# Patient Record
Sex: FEMALE | Race: WHITE | Employment: OTHER | URBAN - METROPOLITAN AREA
[De-identification: names, ages, dates, MRNs, and addresses within clinical notes are randomized per-mention and may not be internally consistent; named-entity substitution may affect disease eponyms.]

---

## 2017-05-11 PROBLEM — I73.9 PAD (PERIPHERAL ARTERY DISEASE) (HCC): Status: ACTIVE | Noted: 2017-05-11

## 2017-06-07 PROBLEM — Z86.73 HX-TIA (TRANSIENT ISCHEMIC ATTACK): Status: ACTIVE | Noted: 2017-06-07

## 2017-06-07 PROBLEM — R09.89 CAROTID BRUIT: Status: ACTIVE | Noted: 2017-06-07

## 2017-06-22 PROCEDURE — 88175 CYTOPATH C/V AUTO FLUID REDO: CPT | Performed by: OBSTETRICS & GYNECOLOGY

## 2018-10-26 PROBLEM — Z96.0 PRESENCE OF PESSARY: Status: ACTIVE | Noted: 2018-10-26

## 2019-03-06 PROBLEM — R94.31 ABNORMAL EKG: Status: ACTIVE | Noted: 2019-03-06

## 2019-06-24 PROCEDURE — 88175 CYTOPATH C/V AUTO FLUID REDO: CPT | Performed by: OBSTETRICS & GYNECOLOGY

## 2020-10-13 ENCOUNTER — HOSPITAL ENCOUNTER (EMERGENCY)
Age: 85
Discharge: HOME | End: 2020-10-13
Attending: EMERGENCY MEDICINE
Payer: MEDICARE

## 2020-10-13 ENCOUNTER — APPOINTMENT (OUTPATIENT)
Dept: CT IMAGING | Age: 85
End: 2020-10-13
Attending: PHYSICIAN ASSISTANT
Payer: MEDICARE

## 2020-10-13 VITALS
DIASTOLIC BLOOD PRESSURE: 66 MMHG | HEIGHT: 64 IN | BODY MASS INDEX: 20.49 KG/M2 | RESPIRATION RATE: 18 BRPM | OXYGEN SATURATION: 96 % | SYSTOLIC BLOOD PRESSURE: 173 MMHG | TEMPERATURE: 98 F | WEIGHT: 120 LBS | HEART RATE: 81 BPM

## 2020-10-13 DIAGNOSIS — T07.XXXA MULTIPLE ABRASIONS: Primary | ICD-10-CM

## 2020-10-13 DIAGNOSIS — S00.83XA CONTUSION OF FACE, INITIAL ENCOUNTER: ICD-10-CM
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
## 2020-10-13 DIAGNOSIS — S09.93XA DENTAL INJURY, INITIAL ENCOUNTER: ICD-10-CM

## 2020-10-13 DIAGNOSIS — S01.511A LIP LACERATION, INITIAL ENCOUNTER: ICD-10-CM

## 2020-10-13 PROCEDURE — 12011 RPR F/E/E/N/L/M 2.5 CM/<: CPT | Performed by: PHYSICIAN ASSISTANT

## 2020-10-13 PROCEDURE — 99284 EMERGENCY DEPT VISIT MOD MDM: CPT | Performed by: PHYSICIAN ASSISTANT

## 2020-10-13 PROCEDURE — 90471 IMMUNIZATION ADMIN: CPT | Performed by: PHYSICIAN ASSISTANT

## 2020-10-13 PROCEDURE — 70450 CT HEAD/BRAIN W/O DYE: CPT | Performed by: PHYSICIAN ASSISTANT

## 2020-10-13 NOTE — ED PROVIDER NOTES
Patient Seen in: THE [facility] Emergency Department In [place]      History   Patient presents with:  Laceration/Abrasion  Fall    Stated Complaint: fall after getting tangled by her dog, no blood thinners, lip laceration    HPI    17-year-old female.   Di Jean vital signs reviewed. All other systems reviewed and negative except as noted above.     Physical Exam     ED Triage Vitals [10/13/20 1800]   BP (!) 173/66   Pulse 81   Resp 18   Temp 98 °F (36.7 °C)   Temp src Temporal   SpO2 96 %   O2 Device None (Ro ([address]) which includes the Dose Index Registry. PATIENT STATED HISTORY: (As transcribed by Technologist)  Patient complain of left supra-orbital laceration due to tripping and falling today, denies loc.     FINDINGS:  There is ce encounter diagnosis)  Fall, initial encounter  Contusion of face, initial encounter  Lip laceration, initial encounter  Dental injury, initial encounter    Disposition:  Discharge  10/13/2020  7:29 pm    Follow-up:  Terrence Duong  [address] [place] [address]

## 2021-09-08 PROBLEM — I73.9 CLAUDICATION (HCC): Status: ACTIVE | Noted: 2021-09-08

## 2024-08-09 ENCOUNTER — HOSPITAL ENCOUNTER (EMERGENCY)
Age: 89
Discharge: HOME | End: 2024-08-09
Payer: MEDICARE

## 2024-08-09 VITALS
DIASTOLIC BLOOD PRESSURE: 63 MMHG | SYSTOLIC BLOOD PRESSURE: 182 MMHG | OXYGEN SATURATION: 95 % | BODY MASS INDEX: 19 KG/M2 | TEMPERATURE: 99 F | HEART RATE: 81 BPM | WEIGHT: 110 LBS | RESPIRATION RATE: 16 BRPM

## 2024-08-09 DIAGNOSIS — S61.214A LACERATION OF RIGHT RING FINGER W/O FOREIGN BODY W/O DAMAGE TO NAIL, INITIAL ENCOUNTER: Primary | ICD-10-CM

## 2024-08-09 PROCEDURE — 99283 EMERGENCY DEPT VISIT LOW MDM: CPT

## 2024-08-09 PROCEDURE — 12001 RPR S/N/AX/GEN/TRNK 2.5CM/<: CPT

## 2024-08-09 RX ORDER — VALSARTAN 80 MG/1
80 TABLET ORAL DAILY
COMMUNITY

## 2024-08-09 NOTE — DISCHARGE INSTRUCTIONS
Keep wound clean and dry.   Do not get stitches wet for the first 24 hours.   After this wash with soap and water twice a day.   Apply triple antibiotic ointment twice a day for the 3 days.   Then leave open to air as the oxygen will help it to heal.   Take Tylenol and/or ibuprofen as needed for pain.   Have the stitches removed in 8-10 days.   Watch for any increased redness, swelling, or drainage.   Follow up with your PCP in 3-5 days.     Thank you for choosing [facility] for your care.

## 2024-08-09 NOTE — ED PROVIDER NOTES
Patient Seen in: [facility] In [place]      History     Chief Complaint   Patient presents with    Laceration     Stated Complaint: right 4th digit lac .states tetnus is up to date    Subjective:   89 yo female presents to the emergency department with c/o finger laceration.  Patient states she was holding on to the edge of her oven to steady herself on a small step-stool.  She stepped down on to the floor and cut her finger on the edge of the oven door.  She has a laceration to her right ring finger.  She believes her tetanus shot is up to date.  She denies any other injuries, numbness, or tingling.     The history is provided by the patient.         Objective:   Past Medical History:    Dyslipidemia    Hypertension    Hypothyroid    PAD (peripheral artery disease) ([facility])    BP DIFFERENCES BTW RT & LT ARM (20MMHG HIGHER IN THE RIGHT)     Renal insufficiency    TIA (transient ischemic attack)              Past Surgical History:   Procedure Laterality Date    Ankle fracture surgery      Back surgery  1992    Carpal tunnel release Right 10/09/2018    Cataract      Colonoscopy      Colonoscopy  03/17/2021    Amspromise Serna    Formerly [facility] trigger point injection      Hysterectomy  09/09/2019    Ir kyphoplasty  01/31/2018    Repair urethra injury,female                  Social History     Socioeconomic History    Marital status:    Tobacco Use    Smoking status: Never    Smokeless tobacco: Never   Vaping Use    Vaping status: Never Used   Substance and Sexual Activity    Alcohol use: No     Alcohol/week: 0.0 standard drinks of alcohol    Drug use: No     Social Determinants of Health      Received from [facility]              Review of Systems   Constitutional: Negative.    Skin:  Positive for wound.   All other systems reviewed and are negative.      Positive for stated Chief Complaint: Laceration    Other systems are as noted in HPI.  Constitutional and vital signs  reviewed.      All other systems reviewed and negative except as noted above.    Physical Exam     ED Triage Vitals [08/09/24 1419]   BP (!) 182/63   Pulse 81   Resp 16   Temp 98.7 °F (37.1 °C)   Temp src Temporal   SpO2 95 %   O2 Device None (Room air)       Current Vitals:   Vital Signs  BP: (!) 182/63  Pulse: 81  Resp: 16  Temp: 98.7 °F (37.1 °C)  Temp src: Temporal    Oxygen Therapy  SpO2: 95 %  O2 Device: None (Room air)            Physical Exam  Vitals and nursing note reviewed.   Constitutional:       General: She is not in acute distress.     Appearance: Normal appearance. She is normal weight. She is not ill-appearing.   HENT:      Head: Normocephalic and atraumatic.      Mouth/Throat:      Mouth: Mucous membranes are moist.      Pharynx: Oropharynx is clear.   Eyes:      Conjunctiva/sclera: Conjunctivae normal.   Cardiovascular:      Rate and Rhythm: Normal rate and regular rhythm.      Pulses: Normal pulses.      Heart sounds: Normal heart sounds.   Pulmonary:      Effort: Pulmonary effort is normal. No respiratory distress.      Breath sounds: Normal breath sounds.   Musculoskeletal:         General: Signs of injury present. Normal range of motion.   Skin:     General: Skin is warm and dry.      Capillary Refill: Capillary refill takes less than 2 seconds.      Comments: 2.5 cm laceration of the skin of the right 4th distal phalanx just distal to the DIP joint. ROM, motor strength and sensation intact. Cap refill <2 sec.     Neurological:      General: No focal deficit present.      Mental Status: She is alert and oriented to person, place, and time.   Psychiatric:         Mood and Affect: Mood normal.         Behavior: Behavior normal.           ED Course   Labs Reviewed - No data to display              MDM              Medical Decision Making  89 yo female with right 4th digit laceration.  Verbalized consent received for repair.  Tetanus is up to date.     Laceration was anesthetized with lidocaine  locally.  The wound was cleansed and irrigated copiously.  There was no visible foreign body within the wound. The wound was closed using a simple closure with 4-0 Prolene.  The quality of the closure was excellent.     Discussed discharge instructions with patient.  Sutures to be removed in 8-10 days.  Follow up with PCP in 1 week as needed.     Risk  OTC drugs.        Disposition and Plan     Clinical Impression:  1. Laceration of right ring finger w/o foreign body w/o damage to nail, initial encounter         Disposition:  Discharge  8/9/2024  2:55 pm    Follow-up:  Rhea Willis  [address]  [facility] [address] [phone]    Follow up in 1 week(s)            Medications Prescribed:  Current Discharge Medication List